# Patient Record
Sex: MALE | Race: WHITE | Employment: UNEMPLOYED | URBAN - METROPOLITAN AREA
[De-identification: names, ages, dates, MRNs, and addresses within clinical notes are randomized per-mention and may not be internally consistent; named-entity substitution may affect disease eponyms.]

---
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
## 2024-11-08 ENCOUNTER — HOSPITAL ENCOUNTER (OUTPATIENT)
Age: 24
Discharge: HOME | End: 2024-11-10

## 2024-11-08 PROCEDURE — 88312 SPECIAL STAINS GROUP 1: CPT

## 2024-11-08 PROCEDURE — 88305 TISSUE EXAM BY PATHOLOGIST: CPT

## 2024-11-15 LAB — SURGICAL PATHOLOGY REPORT: NORMAL

## 2025-03-07 ENCOUNTER — OFFICE VISIT (OUTPATIENT)
Dept: PSYCHOLOGY | Age: 25
End: 2025-03-07

## 2025-03-07 DIAGNOSIS — F33.1 MDD (MAJOR DEPRESSIVE DISORDER), RECURRENT EPISODE, MODERATE (HCC): Primary | ICD-10-CM

## 2025-03-07 PROCEDURE — 90792 PSYCH DIAG EVAL W/MED SRVCS: CPT | Performed by: PSYCHIATRY & NEUROLOGY

## 2025-03-07 NOTE — PROGRESS NOTES
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 Patient had break-up with girlfriend of two years in September.     SUBSTANCE ABUSE HISTORY: Occasional alcohol.    MENTAL STATUS EXAM: White male appears age. Pleasant, cooperative, forthcoming. Normal psychomotor activity, gait, strength, tone, eye contact. Mood anxious. Affect bright, flexible. Nervous laughter. Speech clear. Thought process organized. Content future-oriented. No suicidal or homicidal ideations. No paranoia, delusions, hallucinations. Themes of depression, anxiety. Orientation, concentration, recent and remote memory are grossly intact. Fund of knowledge fair. Language use fair. Insight and judgment fair.     MEDICATIONS:  Zoloft 25 mg nightly x 7 days then 50 mg nightly (new)     ASSESSMENT:  MDD recurrent moderate     Persistent Depressive Disorder     Anxiety Disorder unspecified     PLAN: Support provided. Discussed diagnostic impressions and treatment recommendations. Start Zoloft for anxiety and depressive symptoms. Risks/benefits/alternatives. Discussed common side effects. Discussed boxed warning. Continue to monitor symptoms, side effects and response to medications. Adjust treatment as needed. See back 4 weeks. Continue therapy. Call sooner if needed.       Signed:  Electronically signed by Rafael Messina MD on 3/7/2025 at 10:03 AM

## 2025-04-04 ENCOUNTER — OFFICE VISIT (OUTPATIENT)
Dept: PSYCHOLOGY | Age: 25
End: 2025-04-04

## 2025-04-04 DIAGNOSIS — F33.1 MDD (MAJOR DEPRESSIVE DISORDER), RECURRENT EPISODE, MODERATE (HCC): Primary | ICD-10-CM

## 2025-04-04 PROCEDURE — 99214 OFFICE O/P EST MOD 30 MIN: CPT | Performed by: PSYCHIATRY & NEUROLOGY

## 2025-04-04 RX ORDER — SERTRALINE HYDROCHLORIDE 100 MG/1
100 TABLET, FILM COATED ORAL NIGHTLY
Qty: 30 TABLET | Refills: 0 | Status: SHIPPED | OUTPATIENT
Start: 2025-04-04

## 2025-04-04 NOTE — PROGRESS NOTES
PSYCHIATRY ATTENDING NOTE    S: Patient being seen at [facility] in follow-up for depression and anxiety. Zoloft started last appointment. Met with patient to discuss progress with treatment.     Omkar presents in good spirits  Feels medication starting to take effect  Mood has been a little better  Reports no \"dark thoughts\" recently  Sleeping 7-8 hours a night  No concerning side effects  Continues to benefit from individual therapy  Anxiety about the same; reassurance provided  Discussed optimizing Zoloft dosage  No acute issues or concerns otherwise     MSE:  White male appears age. Pleasant, cooperative, forthcoming. Normal psychomotor activity, gait, strength, tone, eye contact. Mood euthymic. Affect flexible. Speech clear. Thought process organized. Content future-oriented. No suicidal or homicidal ideations. No paranoia, delusions, hallucinations. Themes of depression, anxiety. Orientation, concentration, recent and remote memory are grossly intact. Fund of knowledge fair. Language use fair. Insight and judgment fair.     MEDICATIONS:  Zoloft 100 mg nightly (increasing)    ASSESSMENT: MDD recurrent moderate  Persistent Depressive Disorder  Anxiety Disorder unspecified     PLAN: Support and reassurance provided. Optimize Zoloft. Continue to monitor symptoms, side effects and response to medications. Adjust treatment as needed. See back 4 weeks. Continue therapy. Call sooner if needed.                           Electronically signed by Rafael Messina MD on 4/4/2025 at 9:15 AM

## 2025-05-02 ENCOUNTER — OFFICE VISIT (OUTPATIENT)
Dept: PSYCHOLOGY | Age: 25
End: 2025-05-02

## 2025-05-02 DIAGNOSIS — F33.1 MDD (MAJOR DEPRESSIVE DISORDER), RECURRENT EPISODE, MODERATE (HCC): Primary | ICD-10-CM

## 2025-05-02 PROCEDURE — 99214 OFFICE O/P EST MOD 30 MIN: CPT | Performed by: PSYCHIATRY & NEUROLOGY

## 2025-05-02 RX ORDER — SERTRALINE HYDROCHLORIDE 100 MG/1
100 TABLET, FILM COATED ORAL NIGHTLY
Qty: 90 TABLET | Refills: 1 | Status: SHIPPED | OUTPATIENT
Start: 2025-05-02

## 2025-05-02 NOTE — PROGRESS NOTES
PSYCHIATRY ATTENDING NOTE    S: Patient being seen at [facility] in follow-up for depression and anxiety. Zoloft increased last appointment. Met with patient to discuss progress with treatment.     Omkar presents in good spirits  Feels Zoloft has been helpful  Tolerating without incident  Mood stable overall  Sleeping well   No recent SI  Less anxious  Transitioning to [place] for therapy  Finished semester strong  Mutually agreed to keep dose same  No acute issues or concerns     MSE:  White male appears age. Pleasant, cooperative, forthcoming. Normal psychomotor activity, gait, strength, tone, eye contact. Mood euthymic. Affect flexible. Speech clear. Thought process organized. Content future-oriented. No suicidal or homicidal ideations. No paranoia, delusions, hallucinations. Themes of depression, anxiety. Orientation, concentration, recent and remote memory are grossly intact. Fund of knowledge fair. Language use fair. Insight and judgment fair.     MEDICATIONS:  Zoloft 100 mg nightly (cont)    ASSESSMENT: MDD recurrent moderate  Persistent Depressive Disorder  Anxiety Disorder unspecified     PLAN: Support and reassurance provided. Continue current treatment. Continue to monitor symptoms, side effects and response to medications. See back in fall - call sooner if needed.                           Electronically signed by Rafael Messina MD on 5/2/2025 at 9:20 AM